# Patient Record
Sex: FEMALE | Race: OTHER
[De-identification: names, ages, dates, MRNs, and addresses within clinical notes are randomized per-mention and may not be internally consistent; named-entity substitution may affect disease eponyms.]

---

## 2018-06-06 NOTE — ER DOCUMENT REPORT
ED Dizziness/Weakness





- General


Mode of Arrival: Ambulatory


Information source: Patient


TRAVEL OUTSIDE OF THE [place] IN LAST 30 DAYS: No





<MARTHA GRAJEDA - Last Filed: 06/07/18 02:49>





<VIPIN TREVIZO - Last Filed: 06/07/18 03:20>





- General


Chief Complaint: Dizziness


Stated Complaint: BLOOD PRESSURE PROBLEMS


Time Seen by Provider: 06/06/18 20:57


Notes: 


Patient is a 57 year old female with hypertension presents to the emergency 

department complaining of dizziness and a headache onset yesterday. Patient is 

vague with her history. Patient states she has taken an aspirin for her 

headache. Patient denies nausea, vomiting, diarrhea, abdominal pain or 

inability to move extremities. 





Patient states she has hypertension but has stopped taking medications years 

ago. 


 (MARTHA GRAJEDA)





- Related Data


Allergies/Adverse Reactions: 


 





No Known Allergies Allergy (Unverified 06/07/18 00:24)


 











Past Medical History





- General


Information source: Patient





- Social History


Smoking Status: Unknown if Ever Smoked





- Past Medical History


Cardiac Medical History: Reports: Hx Hypertension - no longer takes medication 

for





<MARTHA GRAJEDA - Last Filed: 06/07/18 02:49>





- Social History


Family History: Reviewed & Not Pertinent


Malignancy Medical History: Reports: Hx Cervical Cancer, Hx Ovarian Cancer





<VIPIN TREVIZO - Last Filed: 06/07/18 03:20>





Review of Systems





- Review of Systems


Constitutional: No symptoms reported


EENT: No symptoms reported


Cardiovascular: See HPI, Dizziness


Respiratory: No symptoms reported


Gastrointestinal: No symptoms reported


Genitourinary: No symptoms reported


Female Genitourinary: No symptoms reported


Musculoskeletal: No symptoms reported


Skin: No symptoms reported


Hematologic/Lymphatic: No symptoms reported


Neurological/Psychological: See HPI, Headaches


-: Yes All other systems reviewed and negative





<MARTHA GRAJEDA - Last Filed: 06/07/18 02:49>





Physical Exam





- Vital signs


Interpretation: Hypertensive, Bradycardic, Tachypneic





- HEENT


Head: Normocephalic, Atraumatic


Conjunctiva: Normal


Cornea: Normal


Mucous membranes: Dry





- Respiratory


Respiratory status: No respiratory distress


Chest status: Nontender


Breath sounds: Normal





- Cardiovascular


Rhythm: Regular, Bradycardia





- Abdominal


Inspection: Normal


Distension: No distension


Bowel sounds: Normal


Tenderness: Nontender


Organomegaly: No organomegaly





- Back


Back: Normal, Nontender





- Extremities


General upper extremity: Normal inspection, Nontender, Normal color, Normal ROM

, Normal temperature


General lower extremity: Normal inspection, Nontender, Normal color, Normal ROM

, Normal temperature, Normal weight bearing.  No: Елеан's sign





- Neurological


Neuro grossly intact: Yes


Cognition: Normal


Orientation: AAOx4


[place] Coma Scale Eye Opening: Spontaneous


[place] Coma Scale Verbal: Oriented


Juan Coma Scale Motor: Obeys Commands


Juan Coma Scale Total: 15


Speech: Normal


Motor strength normal: LUE, RUE, LLE, RLE


Sensory: Normal





- Psychological


Associated symptoms: Normal affect, Normal mood





- Skin


Skin Temperature: Warm


Skin Moisture: Dry


Skin Color: Normal





<VIPIN TREVIZO - Last Filed: 06/07/18 03:20>





- Vital signs


Vitals: 


 











Temp Pulse Resp BP Pulse Ox


 


 98.6 F   43 L  19   240/68 H  100 


 


 06/06/18 20:27  06/06/18 20:27  06/06/18 20:27  06/06/18 20:27  06/06/18 20:27














Course





- Laboratory


Result Diagrams: 


 06/06/18 21:13





 06/06/18 21:13





- Consults


  ** Dr. Chan


Time consulted: 21:15 - Consulted Dr. Chan, recommended a nicardipine drip.





  ** Dr. Chan.


Time consulted: 21:37 - Reviewed EKG and determined heart block. Recommends 

repeating EKG when blood pressure decreases. 





  ** [facility]


Time consulted: 23:34 - [facility] accepts [facility]inet for transfer. Cardiologist 

Recommend stopping nicardipine starting hydralazine.





<MARTHA GRAJEDA - Last Filed: 06/07/18 02:49>





- Laboratory


Result Diagrams: 


 06/06/18 21:13





 06/06/18 21:13





<VIPIN TREVIZO - Last Filed: 06/07/18 03:20>





- Re-evaluation


Re-evalutation: 





06/06/18 22:17


[facility]ed [facility] who stated they do not have any ICU or IMCU beds.  (MARTHA GRAJEDA)








06/07/18 01:05


Patient is a 57-year-old female who comes in complaining of dizziness for the 

last 2 days.  Worse with movement.  Patient is noted to be bradycardic with 

complete heart block on EKG.  She is also hypertensive with a blood pressure of 

260/100.  Patient is supposed to take blood pressure medications but has not 

for years and does not have a primary care doctor.  Patient initially denied 

any other medical problems and then later told nursing staff that she had a 

history of cervical and ovarian cancer which has been treated and she is not 

actively getting treatment for.


Patient was discussed with cardiology here who initially recommended 

nicardipine drip and Lasix.  This was initiated and patient's blood pressure 

came down.  Heart rate may remain the same.  2 more EKGs were performed with 

same finding of complete heart block.


Patient was discussed with [facility] who does not have any beds.


[facility] was contacted.  No beds available.


Patient was discussed with Dr. Braxton You, who will accept the patient 

for transport.  There were initially no beds available.  Recommends stopping 

nicardipine drip and giving hydralazine if needed.


Findings were discussed with patient as well as need for transfer due to 

complete heart block and no availability of electrophysiology cardiology here.  

Patient is agreeable to this plan.  Patient was going to go by ground but there 

is no ground transport available via MICU truck.  According to [facility] protocol, 

patient will be transported via air.  Patient is agreeable to this plan.  She 

has remained stable in the emergency department and her blood pressure has 

trended down nicely.  Of note, no acute findings on blood work, chest x-ray, or 

head CT.  Stable at time of transfer to [facility].


 (VIPIN TREVIZO)





- Vital Signs


Vital signs: 


 











Temp Pulse Resp BP Pulse Ox


 


 98.3 F   43 L  16   184/77 H  100 


 


 06/07/18 01:02  06/06/18 20:27  06/07/18 01:02  06/07/18 01:02  06/07/18 01:02














- Laboratory


Laboratory results interpreted by me: 


 











  06/06/18 06/06/18





  21:13 21:13


 


RDW  14.2 H 


 


Glucose   120 H














Critical Care Note





- Critical Care Note


Total time excluding time spent on procedures (mins): 120 - Evaluation and 

management of hypertensive crisis, symptomatic bradycardia with complete heart 

block, consultation with specialist, coordination of transfer, multiple re-

evaluations, counseling of patient family





<VIPIN TREVIZO - Last Filed: 06/07/18 03:20>





Discharge





<MARTHA GRAJEDA - Last Filed: 06/07/18 02:49>





<VIPIN TREVIZO - Last Filed: 06/07/18 03:20>





- Discharge


Clinical Impression: 


 Complete heart block, Hypertensive urgency, Dizziness





Condition: Stable


Disposition: [facility]


Scribe Attestation: 





06/07/18 03:20


I personally performed the services described in the documentation, reviewed 

and edited the documentation which was dictated to the scribe in my presence, 

and it accurately records my words and actions. (VIPIN TREVIZO)





Scribe Documentation





- Scribe


Written by Conniee:: Sohail Gonsalez, 6/6/2018 21:20


acting as scribe for :: Coni





<MARTHA GRAJEDA - Last Filed: 06/07/18 02:49>

## 2018-06-06 NOTE — RADIOLOGY REPORT (SQ)
EXAM DESCRIPTION:  CT HEAD WITHOUT



COMPLETED DATE/TIME:  6/6/2018 10:02 pm



REASON FOR STUDY:  Head, HTN



COMPARISON:  None.



TECHNIQUE:  Axial images acquired through the brain without intravenous contrast.  Images reviewed wi
th bone, brain and subdural windows.   Images stored on PACS.

All CT scanners at this facility use dose modulation, iterative reconstruction, and/or weight based d
osing when appropriate to reduce radiation dose to as low as reasonably achievable (ALARA).

[facility]: Dose Right  [facility]: CareDose    [facility]: Dose Right    [facility]: Teradose 4D    [facility]: [employer] Technologies



RADIATION DOSE:  CT Rad equipment meets quality standard of care and radiation dose reduction techniq
ues were employed. CTDIvol: 55.2 mGy. DLP: 1056 mGy-cm. mGy.



LIMITATIONS:  None.



FINDINGS:  VENTRICLES: Normal size and contour.

CEREBRUM: No masses.  No hemorrhage.  No midline shift.  No evidence for acute infarction. Normal gra
y/white matter differentiation. No areas of low density in the white matter.

CEREBELLUM: No masses.  No hemorrhage.  No alteration of density.  No evidence for acute infarction.

EXTRAAXIAL SPACES: No fluid collections.  No masses.

ORBITS AND GLOBE: No intra- or extraconal masses.  Normal contour of globe without masses.

CALVARIUM: No fracture.

PARANASAL SINUSES: No fluid or mucosal thickening.

SOFT TISSUES: No mass or hematoma.

OTHER: No other significant finding.



IMPRESSION:  No acute intracranial findings.

EVIDENCE OF ACUTE STROKE: NO.



COMMENT:  Quality ID # 436: Final reports with documentation of one or more dose reduction techniques
 (e.g., Automated exposure control, adjustment of the mA and/or kV according to patient size, use of 
iterative reconstruction technique)



TECHNICAL DOCUMENTATION:  JOB ID:  [id number]

TX-72

 2011 [employer] All Rights Reserved



Reading location - IP/workstation name: [email or web address]

## 2018-06-06 NOTE — RADIOLOGY REPORT (SQ)
EXAM DESCRIPTION:  CHEST SINGLE VIEW



COMPLETED DATE/TIME:  6/6/2018 10:03 pm



REASON FOR STUDY:  HTN urgency



COMPARISON:  None.



EXAM PARAMETERS:  NUMBER OF VIEWS: One view.

TECHNIQUE: Single frontal radiographic view of the chest acquired.

RADIATION DOSE: NA

LIMITATIONS: None.



FINDINGS:  LUNGS AND PLEURA: No consolidation, masses or pneumothorax. No pleural effusion.

MEDIASTINUM AND HILAR STRUCTURES: No masses.  Contour normal.

HEART AND VASCULAR STRUCTURES: Heart upper limits of normal in size.  Normal vasculature.

BONES: No acute findings.

HARDWARE: None in the chest.

OTHER: No other significant finding.



IMPRESSION:  NO ACUTE RADIOGRAPHIC FINDING IN THE CHEST.



TECHNICAL DOCUMENTATION:  JOB ID:  [id number]

[id number]

 2011 [employer]- All Rights Reserved



Reading location - IP/workstation name: [employer]

## 2018-06-07 NOTE — EKG REPORT
SEVERITY:- ABNORMAL ECG -

COMPLETE AV BLOCK, A-RATE 80

MINIMAL ST DEPRESSION, ANTEROLATERAL LEADS

:

Confirmed by: Jamaica Thompson MD 07-Jun-2018 11:43:21

## 2018-06-07 NOTE — EKG REPORT
SEVERITY:- ABNORMAL ECG -

COMPLETE AV BLOCK, A-RATE 87

MINIMAL ST DEPRESSION, ANTEROLATERAL LEADS

:

Confirmed by: Jamaica Thompson MD 07-Jun-2018 11:43:29

## 2018-07-22 NOTE — EKG REPORT
SEVERITY:- ABNORMAL ECG -

ATRIAL-SENSED VENTRICULAR-PACED RHYTHM

:

Confirmed by: Margie Chan 22-Jul-2018 23:16:35

## 2018-07-22 NOTE — RADIOLOGY REPORT (SQ)
EXAM DESCRIPTION:  CHEST 2 VIEWS



COMPLETED DATE/TIME:  7/22/2018 1:55 pm



REASON FOR STUDY:  sob



COMPARISON:  6/6/2018



EXAM PARAMETERS:  NUMBER OF VIEWS: two views

TECHNIQUE: Digital Frontal and Lateral radiographic views of the chest acquired.

RADIATION DOSE: NA

LIMITATIONS: none



FINDINGS:  LUNGS AND PLEURA: No opacities, masses or pneumothorax. No pleural effusion.

MEDIASTINUM AND HILAR STRUCTURES: No masses or contour abnormalities.

HEART AND VASCULAR STRUCTURES: Heart normal size.  No evidence for failure.

BONES: No acute findings.

HARDWARE: Left chest wall cardiac device

OTHER: No other significant finding.



IMPRESSION:  NO ACUTE RADIOGRAPHIC FINDING IN THE CHEST.



TECHNICAL DOCUMENTATION:  JOB ID:  [id number]

 2011 [employer]- All Rights Reserved



Reading location - IP/workstation name: [email or web address]

## 2018-07-22 NOTE — ER DOCUMENT REPORT
ED General





- General


Chief Complaint: Dizziness


Stated Complaint: NUMBNESS,BLOOD PREESURE


Time Seen by Provider: 07/22/18 13:10


TRAVEL OUTSIDE OF THE [place] IN LAST 30 DAYS: No





- HPI


Patient complains to provider of: Numbness pressure intermittent dizziness


Notes: 





There is Rosaura of the morning wanting his Xanax to she is patient coming in 

for evaluation of the above-stated symptoms states ongoing for many weeks.  

Patient states also increased stress.  Patient denies any nausea vomiting chest 

pain patient recently was seen here in the [facility] 2 provided for a pacemaker 

due to third-degree heart block.  Patient states she has follow-up with her 

cardiologist at pacemaker has been fine and they have no clear etiology for her 

symptoms as well.  Patient denies any fever chills nausea vomiting diarrhea.  

Resting comfortably upon my evaluation.  





- Related Data


Allergies/Adverse Reactions: 


 





No Known Allergies Allergy (Verified 07/22/18 11:40)


 











Past Medical History





- Social History


Smoking Status: Never Smoker


Chew tobacco use (# tins/day): No


Frequency of alcohol use: None


Drug Abuse: None


Family History: Reviewed & Not Pertinent


Patient has suicidal ideation: No


Patient has homicidal ideation: No





- Past Medical History


Cardiac Medical History: Reports: Hx Hypertension - no longer takes medication 

for


Renal/ Medical History: Denies: Hx Peritoneal Dialysis


Malignancy Medical History: Reports: Hx Cervical Cancer, Hx Ovarian Cancer


Past Surgical History: Reports: Hx Cardiac Surgery - pacemaker





Review of Systems





- Review of Systems


Constitutional: Other - Numbness tingling dizziness


EENT: No symptoms reported


Cardiovascular: No symptoms reported


Respiratory: No symptoms reported


Gastrointestinal: No symptoms reported


Genitourinary: No symptoms reported


Female Genitourinary: No symptoms reported


Musculoskeletal: No symptoms reported


Skin: No symptoms reported


Hematologic/Lymphatic: No symptoms reported


Neurological/Psychological: No symptoms reported





Physical Exam





- Vital signs


Vitals: 





 











Temp Pulse Resp BP Pulse Ox


 


 98.5 F   86   18   152/80 H  97 


 


 07/22/18 12:19  07/22/18 12:19  07/22/18 12:19  07/22/18 12:19  07/22/18 12:19











Interpretation: Normal





- General


General appearance: Appears well, Alert





- HEENT


Head: Normocephalic, Atraumatic


Eyes: Normal


Pupils: PERRL





- Respiratory


Respiratory status: No respiratory distress


Chest status: Nontender


Breath sounds: Normal


Chest palpation: Normal


Notes: 





Pacemaker scar intact to the left upper chest





- Cardiovascular


Rhythm: Regular


Heart sounds: Normal auscultation


Murmur: No





- Abdominal


Inspection: Normal


Distension: No distension


Bowel sounds: Normal


Tenderness: Nontender


Organomegaly: No organomegaly





- Back


Back: Normal, Nontender





- Extremities


General upper extremity: Normal inspection, Nontender, Normal color, Normal ROM

, Normal temperature


General lower extremity: Normal inspection, Nontender, Normal color, Normal ROM

, Normal temperature, Normal weight bearing.  No: Елена's sign





- Neurological


Neuro grossly intact: Yes


Cognition: Normal


Orientation: AAOx4


Juan Coma Scale Eye Opening: Spontaneous


[place] Coma Scale Verbal: Oriented


[place] Coma Scale Motor: Obeys Commands


Juan Coma Scale Total: 15


Speech: Normal


Motor strength normal: LUE, RUE, LLE, RLE


Sensory: Normal





- Psychological


Associated symptoms: Normal affect, Normal mood





- Skin


Skin Temperature: Warm


Skin Moisture: Dry


Skin Color: Normal





Course





- Re-evaluation


Re-evalutation: 





07/22/18 15:24


The patient has nonspecific symptoms as the patient's nonspecific symptoms is 

not suggestive of pulmonary embolus, cardiac ischemia, aortic dissection, or 

other serious etiology. Given the extremely low risk of these diagnoses further 

testing and evaluation for these possibilities does not appear to be indicated 

at this time. The patient has been instructed to return if the symptoms worsen 

or change in any way.  Patient more likely has underlying anxiety.  Patient 

will be given a prescription for Vistaril to help out with her symptoms.  

Patient will be discharged home.





- Vital Signs


Vital signs: 





 











Temp Pulse Resp BP Pulse Ox


 


 98.4 F   77   17   149/75 H  97 


 


 07/22/18 15:20  07/22/18 15:20  07/22/18 15:20  07/22/18 15:20  07/22/18 12:19














- Laboratory


Result Diagrams: 


 07/22/18 13:21





 07/22/18 13:21


Laboratory results interpreted by me: 





 











  07/22/18 07/22/18





  13:21 13:21


 


RDW  14.1 H 


 


Sodium   145.8 H


 


Total Protein   8.4 H


 


Albumin   5.1 H














Discharge





- Discharge


Clinical Impression: 


 Numbness and tingling, Lightheaded





Condition: Good


Instructions:  Numbness or Paresthesia ([facility]), Anxiety ([facility])


Additional Instructions: 


Your laboratory studies chest x-ray today did not show any critical pathology.  

Some of the symptoms that you are prescribing may be due to underlying anxiety 

due to your recent medical history.  We recommend that we start her on a 

medication called Vistaril.  Please take this at night as prescribed.  Please 

be aware this medication may make you sleepy.  Follow-up with your primary care 

physician return to ER symptoms worsen.


Prescriptions: 


Hydroxyzine Pamoate [Vistaril 25 mg Capsule] 25 mg PO QHS #14 capsule

## 2019-02-27 ENCOUNTER — HOSPITAL ENCOUNTER (EMERGENCY)
Dept: HOSPITAL 62 - ER | Age: 59
LOS: 1 days | Discharge: HOME | End: 2019-02-28
Payer: MEDICAID

## 2019-02-27 DIAGNOSIS — R07.89: Primary | ICD-10-CM

## 2019-02-27 DIAGNOSIS — Z95.0: ICD-10-CM

## 2019-02-27 DIAGNOSIS — R10.9: ICD-10-CM

## 2019-02-27 DIAGNOSIS — R42: ICD-10-CM

## 2019-02-27 DIAGNOSIS — I10: ICD-10-CM

## 2019-02-27 DIAGNOSIS — Z85.41: ICD-10-CM

## 2019-02-27 DIAGNOSIS — Z85.43: ICD-10-CM

## 2019-02-27 DIAGNOSIS — K08.89: ICD-10-CM

## 2019-02-27 LAB
ADD MANUAL DIFF: NO
ALBUMIN SERPL-MCNC: 4.7 G/DL (ref 3.5–5)
ALP SERPL-CCNC: 93 U/L (ref 38–126)
ALT SERPL-CCNC: 21 U/L (ref 9–52)
ANION GAP SERPL CALC-SCNC: 9 MMOL/L (ref 5–19)
APPEARANCE UR: CLEAR
APTT PPP: YELLOW S
AST SERPL-CCNC: 28 U/L (ref 14–36)
BASOPHILS # BLD AUTO: 0.1 10^3/UL (ref 0–0.2)
BASOPHILS NFR BLD AUTO: 0.9 % (ref 0–2)
BILIRUB DIRECT SERPL-MCNC: 0.1 MG/DL (ref 0–0.4)
BILIRUB SERPL-MCNC: 0.4 MG/DL (ref 0.2–1.3)
BILIRUB UR QL STRIP: NEGATIVE
BUN SERPL-MCNC: 17 MG/DL (ref 7–20)
CALCIUM: 9.6 MG/DL (ref 8.4–10.2)
CHLORIDE SERPL-SCNC: 105 MMOL/L (ref 98–107)
CK MB SERPL-MCNC: < 0.22 NG/ML (ref ?–4.55)
CK SERPL-CCNC: 58 U/L (ref 30–135)
CO2 SERPL-SCNC: 30 MMOL/L (ref 22–30)
EOSINOPHIL # BLD AUTO: 0.2 10^3/UL (ref 0–0.6)
EOSINOPHIL NFR BLD AUTO: 2.5 % (ref 0–6)
ERYTHROCYTE [DISTWIDTH] IN BLOOD BY AUTOMATED COUNT: 13.2 % (ref 11.5–14)
GLUCOSE SERPL-MCNC: 97 MG/DL (ref 75–110)
GLUCOSE UR STRIP-MCNC: NEGATIVE MG/DL
HCT VFR BLD CALC: 38.9 % (ref 36–47)
HGB BLD-MCNC: 13.4 G/DL (ref 12–15.5)
INR PPP: 0.98
KETONES UR STRIP-MCNC: NEGATIVE MG/DL
LIPASE SERPL-CCNC: 119 U/L (ref 23–300)
LYMPHOCYTES # BLD AUTO: 2.5 10^3/UL (ref 0.5–4.7)
LYMPHOCYTES NFR BLD AUTO: 31.5 % (ref 13–45)
MCH RBC QN AUTO: 29.9 PG (ref 27–33.4)
MCHC RBC AUTO-ENTMCNC: 34.4 G/DL (ref 32–36)
MCV RBC AUTO: 87 FL (ref 80–97)
MONOCYTES # BLD AUTO: 0.5 10^3/UL (ref 0.1–1.4)
MONOCYTES NFR BLD AUTO: 6.1 % (ref 3–13)
NEUTROPHILS # BLD AUTO: 4.6 10^3/UL (ref 1.7–8.2)
NEUTS SEG NFR BLD AUTO: 59 % (ref 42–78)
NITRITE UR QL STRIP: NEGATIVE
PH UR STRIP: 7 [PH] (ref 5–9)
PLATELET # BLD: 351 10^3/UL (ref 150–450)
POTASSIUM SERPL-SCNC: 4.2 MMOL/L (ref 3.6–5)
PROT SERPL-MCNC: 7.6 G/DL (ref 6.3–8.2)
PROT UR STRIP-MCNC: NEGATIVE MG/DL
PROTHROMBIN TIME: 13.5 SEC (ref 11.4–15.4)
RBC # BLD AUTO: 4.49 10^6/UL (ref 3.72–5.28)
SODIUM SERPL-SCNC: 143.9 MMOL/L (ref 137–145)
SP GR UR STRIP: 1.01
TOTAL CELLS COUNTED % (AUTO): 100 %
TROPONIN I SERPL-MCNC: < 0.012 NG/ML
UROBILINOGEN UR-MCNC: NEGATIVE MG/DL (ref ?–2)
WBC # BLD AUTO: 7.9 10^3/UL (ref 4–10.5)

## 2019-02-27 PROCEDURE — 93010 ELECTROCARDIOGRAM REPORT: CPT

## 2019-02-27 PROCEDURE — 81001 URINALYSIS AUTO W/SCOPE: CPT

## 2019-02-27 PROCEDURE — 83735 ASSAY OF MAGNESIUM: CPT

## 2019-02-27 PROCEDURE — 93005 ELECTROCARDIOGRAM TRACING: CPT

## 2019-02-27 PROCEDURE — 85025 COMPLETE CBC W/AUTO DIFF WBC: CPT

## 2019-02-27 PROCEDURE — 80053 COMPREHEN METABOLIC PANEL: CPT

## 2019-02-27 PROCEDURE — 96361 HYDRATE IV INFUSION ADD-ON: CPT

## 2019-02-27 PROCEDURE — 96374 THER/PROPH/DIAG INJ IV PUSH: CPT

## 2019-02-27 PROCEDURE — 85610 PROTHROMBIN TIME: CPT

## 2019-02-27 PROCEDURE — 83690 ASSAY OF LIPASE: CPT

## 2019-02-27 PROCEDURE — 87086 URINE CULTURE/COLONY COUNT: CPT

## 2019-02-27 PROCEDURE — 84484 ASSAY OF TROPONIN QUANT: CPT

## 2019-02-27 PROCEDURE — 82553 CREATINE MB FRACTION: CPT

## 2019-02-27 PROCEDURE — 82550 ASSAY OF CK (CPK): CPT

## 2019-02-27 PROCEDURE — 71045 X-RAY EXAM CHEST 1 VIEW: CPT

## 2019-02-27 PROCEDURE — 36415 COLL VENOUS BLD VENIPUNCTURE: CPT

## 2019-02-27 PROCEDURE — 99284 EMERGENCY DEPT VISIT MOD MDM: CPT

## 2019-02-27 NOTE — ER DOCUMENT REPORT
ED General





- General


Chief Complaint: Chest Pain


Stated Complaint: DIZZINESS


Time Seen by Provider: 02/27/19 22:17


TRAVEL OUTSIDE OF THE [place] IN LAST 30 DAYS: No





- HPI


Patient complains to provider of: Chest pain dizziness dental pain


Notes: 





Patient coming in for evaluation of chest pain dizziness and dental pain.  

States pain is ongoing in her chest with the dizziness approximately greater 

than a week.  Patient states that she is not done any new physical activity 

however does active during the day.  Patient states that her pain left side of 

the chest underneath the bra line and to the left flank.  Patient denies any 

fevers chills nausea vomiting denies any urinary diarrhea.  Patient states that 

the pain is increased whenever she moves.  Denies any rashes to the area.  

Patient states she has dental pain lower left molar.  Patient states she has 

been seen by multiple dentist however because of her pacemaker and new PCP that 

she cannot get cardiac clinic for clearance to have her tooth removed.  Patient 

otherwise is resting comfortably upon my evaluation.





- Related Data


Allergies/Adverse Reactions: 


                                        





No Known Allergies Allergy (Verified 07/22/18 11:40)


   











Past Medical History





- Social History


Smoking Status: Unknown if Ever Smoked


Family History: Reviewed & Not Pertinent





- Past Medical History


Cardiac Medical History: Reports: Hx Hypertension - no longer takes medication 

for


Renal/ Medical History: Denies: Hx Peritoneal Dialysis


Malignancy Medical History: Reports: Hx Cervical Cancer, Hx Ovarian Cancer


Past Surgical History: Reports: Hx Cardiac Surgery - pacemaker





Review of Systems





- Review of Systems


Constitutional: No symptoms reported


EENT: Other - Dental pain


Cardiovascular: Chest pain


Respiratory: No symptoms reported


Gastrointestinal: No symptoms reported


Genitourinary: No symptoms reported


Female Genitourinary: No symptoms reported


Musculoskeletal: No symptoms reported


Skin: No symptoms reported


Hematologic/Lymphatic: No symptoms reported


Neurological/Psychological: No symptoms reported


-: Yes All other systems reviewed and negative





Physical Exam





- Vital signs


Vitals: 


                                        











Temp Pulse Resp BP Pulse Ox


 


 99.0 F   74   18   145/69 H  98 


 


 02/27/19 21:00  02/27/19 21:00  02/27/19 21:00  02/27/19 21:00  02/27/19 21:00











Interpretation: Normal





- General


General appearance: Appears well, Alert





- HEENT


Head: Normocephalic, Atraumatic


Eyes: Normal


Pupils: PERRL


Notes: 





Left lower molar with diffuse dental disease no signs of abscess





- Respiratory


Respiratory status: No respiratory distress


Chest status: Nontender


Breath sounds: Normal


Chest palpation: Normal





- Cardiovascular


Rhythm: Regular


Heart sounds: Normal auscultation


Murmur: No





- Abdominal


Inspection: Normal


Distension: No distension


Bowel sounds: Normal


Tenderness: Nontender


Organomegaly: No organomegaly





- Back


Back: Normal, Tender - Left flank pain on palpation no CVA tenderness exactly 

pain is reproduced following the lower rib segments of the chest wall





- Extremities


General upper extremity: Normal inspection, Nontender, Normal color, Normal ROM,

Normal temperature


General lower extremity: Normal inspection, Nontender, Normal color, Normal ROM,

Normal temperature, Normal weight bearing.  No: Елена's sign





- Neurological


Neuro grossly intact: Yes


Cognition: Normal


Orientation: AAOx4


[place] Coma Scale Eye Opening: Spontaneous


[place] Coma Scale Verbal: Oriented


[place] Coma Scale Motor: Obeys Commands


Juan Coma Scale Total: 15


Speech: Normal


Motor strength normal: LUE, RUE, LLE, RLE


Sensory: Normal





- Psychological


Associated symptoms: Normal affect, Normal mood





- Skin


Skin Temperature: Warm


Skin Moisture: Dry


Skin Color: Normal





Course





- Re-evaluation


Re-evalutation: 





02/27/19 23:02


Pain seems to be more muscle skeletal on physical examination we will continue 

with cardiac and urinalysis for complete workup.


02/28/19 04:02


Patient's workup did not show any critical pathology.  The patient has atypical 

chest pain as the patient's chest pain is not suggestive of pulmonary embolus, 

cardiac ischemia, aortic dissection, or other serious etiology. Given the 

extremely low risk of these diagnoses further testing and evaluation for these 

possibilities does not appear to be indicated at this time. The patient has been

instructed to return if the symptoms worsen or change in any way.





Question again by the daughter at bedside about the patient's dental disease.


Explained to the patient would recommend follow-up with the [facility] dental clinics [facility].  Because of the diffuse dental 

disease will start patient on penicillin to prevent any infection.





- Vital Signs


Vital signs: 


                                        











Temp Pulse Resp BP Pulse Ox


 


 99.0 F   74   17   128/63 H  97 


 


 02/27/19 21:00  02/27/19 21:00  02/28/19 01:01  02/28/19 01:01  02/28/19 01:01














- Laboratory


Result Diagrams: 


                                 02/27/19 22:14





                                 02/27/19 22:14


Laboratory results interpreted by me: 


                                        











  02/27/19





  23:12


 


Ur Leukocyte Esterase  TRACE H


 


Urine Ascorbic Acid  40 H














Discharge





- Discharge


Clinical Impression: 


 Chest wall pain, Dental disease





Condition: Good


Disposition: HOME, SELF-CARE


Instructions:  Anti-Inflammatory Medication ([facility]), Chest Wall Pain ([facility]), 

Dentist, Dental Infection or Abscess ([facility])


Additional Instructions: 


Your evaluation today is consistent with muscle skeletal pain or chest wall 

pain.  We recommend taking Tylenol Motrin for your pain control.  Your 

evaluation of your teeth does show a dental fracture of the last molar that may 

be becoming infected.  I highly recommend taking the antibiotics as prescribed 

please make sure you rinse your mouth out after eating.  I would recommend 

eating soft foods.  I would highly recommend following up with 1 of the dental 

clinics listed.  He may have to travel to [facility] to see the dental 

school for treatment.





Return to ER symptoms worsen take medications as prescribed.


Prescriptions: 


Ibuprofen [Motrin 600 mg Tablet] 600 mg PO Q8HP PRN #21 tablet


 PRN Reason: 


Penicillin V Potassium [Penicillin Vk 500 mg Tablet] 500 mg PO BID #20 tablet


Forms:  Return to Work

## 2019-02-27 NOTE — RADIOLOGY REPORT (SQ)
EXAM DESCRIPTION:

XR CHEST 1 VIEW



COMPLETED DATE/TME:  02/27/2019 22:17



CLINICAL HISTORY:

58 years Female, sob/cp



COMPARISON:

6/6/18



NUMBER OF VIEWS/TECHNIQUE:

1/AP 



FINDINGS:



Adequate lung volume, clear parenchyma, normal cardiac

silhouette, and intact bony thorax.  Left cardiac stimulator with

leads.   



IMPRESSION:



No acute cardiopulmonary findings.

## 2019-02-28 VITALS — DIASTOLIC BLOOD PRESSURE: 63 MMHG | SYSTOLIC BLOOD PRESSURE: 128 MMHG

## 2019-02-28 NOTE — EKG REPORT
SEVERITY:- ABNORMAL ECG -

ATRIAL-SENSED VENTRICULAR-PACED RHYTHM

:

Confirmed by: Margie Chan 28-Feb-2019 22:20:50